# Patient Record
Sex: FEMALE | Race: WHITE | HISPANIC OR LATINO | Employment: OTHER | URBAN - METROPOLITAN AREA
[De-identification: names, ages, dates, MRNs, and addresses within clinical notes are randomized per-mention and may not be internally consistent; named-entity substitution may affect disease eponyms.]

---

## 2020-11-10 ENCOUNTER — HOSPITAL ENCOUNTER (EMERGENCY)
Facility: HOSPITAL | Age: 75
Discharge: HOME | End: 2020-11-10
Attending: EMERGENCY MEDICINE
Payer: MEDICARE

## 2020-11-10 VITALS
RESPIRATION RATE: 20 BRPM | HEART RATE: 100 BPM | WEIGHT: 204 LBS | BODY MASS INDEX: 40.05 KG/M2 | OXYGEN SATURATION: 99 % | SYSTOLIC BLOOD PRESSURE: 177 MMHG | HEIGHT: 60 IN | TEMPERATURE: 98 F | DIASTOLIC BLOOD PRESSURE: 86 MMHG

## 2020-11-10 DIAGNOSIS — S39.012A STRAIN OF LUMBAR REGION, INITIAL ENCOUNTER: ICD-10-CM

## 2020-11-10 DIAGNOSIS — G89.29 CHRONIC BILATERAL LOW BACK PAIN WITHOUT SCIATICA: Primary | ICD-10-CM

## 2020-11-10 DIAGNOSIS — M54.50 CHRONIC BILATERAL LOW BACK PAIN WITHOUT SCIATICA: Primary | ICD-10-CM

## 2020-11-10 PROCEDURE — 99284 EMERGENCY DEPT VISIT MOD MDM: CPT | Mod: 25

## 2020-11-10 PROCEDURE — 96372 THER/PROPH/DIAG INJ SC/IM: CPT

## 2020-11-10 PROCEDURE — 63600175 PHARM REV CODE 636 W HCPCS: Performed by: NURSE PRACTITIONER

## 2020-11-10 PROCEDURE — 25000003 PHARM REV CODE 250: Performed by: NURSE PRACTITIONER

## 2020-11-10 RX ORDER — MELOXICAM 15 MG/1
15 TABLET ORAL DAILY
Qty: 10 TABLET | Refills: 0 | Status: SHIPPED | OUTPATIENT
Start: 2020-11-10 | End: 2020-11-20

## 2020-11-10 RX ORDER — METHOCARBAMOL 500 MG/1
500 TABLET, FILM COATED ORAL EVERY 6 HOURS PRN
Qty: 20 TABLET | Refills: 0 | Status: SHIPPED | OUTPATIENT
Start: 2020-11-10

## 2020-11-10 RX ORDER — KETOROLAC TROMETHAMINE 30 MG/ML
30 INJECTION, SOLUTION INTRAMUSCULAR; INTRAVENOUS
Status: COMPLETED | OUTPATIENT
Start: 2020-11-10 | End: 2020-11-10

## 2020-11-10 RX ORDER — HYDROCODONE BITARTRATE AND ACETAMINOPHEN 5; 325 MG/1; MG/1
1 TABLET ORAL ONCE
Status: COMPLETED | OUTPATIENT
Start: 2020-11-10 | End: 2020-11-10

## 2020-11-10 RX ADMIN — KETOROLAC TROMETHAMINE 30 MG: 30 INJECTION, SOLUTION INTRAMUSCULAR; INTRAVENOUS at 07:11

## 2020-11-10 RX ADMIN — HYDROCODONE BITARTRATE AND ACETAMINOPHEN 1 TABLET: 5; 325 TABLET ORAL at 07:11

## 2020-11-11 NOTE — DISCHARGE INSTRUCTIONS
Take all medications as prescribed per pharmacy instructions.      Download the [email or web address] neto to access your health records & test results.  Please remember that you had a visit to the emergency room today and this does not substitute as primary care services for ongoing management because emergency services is a snap shot in time.  Should you have any worsening condition that requires emergency services do not hesitate to return to the ER.    COVID-19 TESTING  Hot Line [phone]  [address] [place] [address]  [facility]  Hours: 8am-5pm Monday - Friday   8am-noon Saturday - Sunday

## 2020-11-11 NOTE — ED PROVIDER NOTES
Encounter Date: 11/10/2020       History     Chief Complaint   Patient presents with    Back Pain     Patient complaining of lower back pain.     74-year-old female presents to ER for concerns of atraumatic low back pain; patient has chronic low back pain but the pain has been gradually worsening x1 week, pain exacerbates with attempting to stand erect, bend or twist, denies significant alleviating factor, she has prescription Ultram that has not improved the pain    Denies:  pelvic pain/numbness/tingling, loss of bowel/bladder control, previous low back injury or surgery    No previous evaluation has been performed nor has PCP Dr. Yanni Khalil been contacted for today's concerns    Past medical/surgical history, allergies & current medications reviewed with patient    Known SARS-CoV2 exposure:  No  Room:  PIT    The history is provided by the patient. No  was used.     Review of patient's allergies indicates:   Allergen Reactions    Pcn [penicillins]      Past Medical History:   Diagnosis Date    Arthritis     Chronic back pain      Past Surgical History:   Procedure Laterality Date     SECTION      HYSTERECTOMY      KNEE SURGERY       History reviewed. No pertinent family history.  Social History     Tobacco Use    Smoking status: Never Smoker   Substance Use Topics    Alcohol use: Never     Frequency: Never    Drug use: Never     Review of Systems   Constitutional: Negative for fever.   HENT: Negative for sore throat.    Respiratory: Negative for cough and shortness of breath.    Cardiovascular: Negative for chest pain.   Gastrointestinal: Negative for abdominal pain and nausea.   Genitourinary: Negative for dysuria.   Musculoskeletal: Positive for back pain, gait problem and myalgias. Negative for neck pain.   Skin: Negative for rash.   Neurological: Negative for weakness and headaches.   Hematological: Does not bruise/bleed easily.   All other systems reviewed and are  negative.      Physical Exam     Initial Vitals [11/10/20 1931]   BP Pulse Resp Temp SpO2   (!) 177/86 100 20 98.3 °F (36.8 °C) 99 %      MAP       --         Physical Exam    Nursing note and vitals reviewed.  Constitutional: She appears well-developed. She does not appear ill. No distress.   AF, VSS   HENT:   Head: Normocephalic and atraumatic.   Right Ear: External ear normal.   Left Ear: External ear normal.   Nose: Nose normal.   Eyes: Lids are normal.   Neck: Neck supple.   Cardiovascular: Normal rate.   Pulmonary/Chest: Effort normal. No respiratory distress.   Abdominal: She exhibits no distension.   Musculoskeletal:      Cervical back: Normal.      Thoracic back: Normal.      Lumbar back: She exhibits decreased range of motion (secondary to pain, unable to stand fully erect) and pain (moderate & reproducible to bilateral paraspinal muscles). She exhibits no bony tenderness (no crepitus or step-offs appreciated), no swelling, no deformity, no spasm and normal pulse.   Neurological: She is alert.   Skin: No rash noted.   Psychiatric: She has a normal mood and affect.         ED Course   Procedures  Labs Reviewed - No data to display       Imaging Results    None          Medical Decision Making:   ED Management:  Medications given:  Norco, Toradol    Findings, diagnosis & plan of care discussed with patient:  Acute on chronic low back pain/strain; in addition to Ultram that she has already taken we will prescribe Mobic and Robaxin, care instructions given -- instructed to follow-up with PCP for any further concerns    All questions answered, strict return precautions given, patient agrees with plan of care & verbalizes understanding to all instructions, pleasant visit -- vital signs are stable & patient is in no distress at discharge    Disclaimer:  This note was prepared with [employer] voice recognition transcription software. Garbled syntax, mangled pronouns, and other bizarre constructions  may be attributed to that software system.  Should there be any questions do not hesitate to contact me for clarification.                               Clinical Impression:       ICD-10-CM ICD-9-CM   1. Chronic bilateral low back pain without sciatica  M54.5 724.2    G89.29 338.29   2. Strain of lumbar region, initial encounter  S39.012A 847.2                          ED Disposition Condition    Discharge Stable        ED Prescriptions     Medication Sig Dispense Start Date End Date Auth. Provider    meloxicam (MOBIC) 15 MG tablet Take 1 tablet (15 mg total) by mouth once daily. for 10 days 10 tablet 11/10/2020 11/20/2020 Emeka Bhatia NP    methocarbamoL (ROBAXIN) 500 MG Tab Take 1 tablet (500 mg total) by mouth every 6 (six) hours as needed (pain / strain). 20 tablet 11/10/2020  Emeka Bhatia NP        Follow-up Information     Follow up With Specialties Details Why Contact Info    PCP / Dr. Yanni Khalil  Go to  For any further concerns                                        Emeka Bhatia NP  11/10/20 1943

## 2022-11-30 ENCOUNTER — OFFICE VISIT (OUTPATIENT)
Dept: URGENT CARE | Facility: CLINIC | Age: 77
End: 2022-11-30
Payer: MEDICARE

## 2022-11-30 VITALS
WEIGHT: 200 LBS | TEMPERATURE: 98 F | DIASTOLIC BLOOD PRESSURE: 64 MMHG | HEART RATE: 84 BPM | HEIGHT: 62 IN | SYSTOLIC BLOOD PRESSURE: 122 MMHG | BODY MASS INDEX: 36.8 KG/M2 | OXYGEN SATURATION: 97 %

## 2022-11-30 DIAGNOSIS — H10.9 CONJUNCTIVITIS OF BOTH EYES, UNSPECIFIED CONJUNCTIVITIS TYPE: Primary | ICD-10-CM

## 2022-11-30 PROCEDURE — 1159F PR MEDICATION LIST DOCUMENTED IN MEDICAL RECORD: ICD-10-PCS | Mod: CPTII,S$GLB,, | Performed by: NURSE PRACTITIONER

## 2022-11-30 PROCEDURE — 1126F AMNT PAIN NOTED NONE PRSNT: CPT | Mod: CPTII,S$GLB,, | Performed by: NURSE PRACTITIONER

## 2022-11-30 PROCEDURE — 3078F DIAST BP <80 MM HG: CPT | Mod: CPTII,S$GLB,, | Performed by: NURSE PRACTITIONER

## 2022-11-30 PROCEDURE — 3078F PR MOST RECENT DIASTOLIC BLOOD PRESSURE < 80 MM HG: ICD-10-PCS | Mod: CPTII,S$GLB,, | Performed by: NURSE PRACTITIONER

## 2022-11-30 PROCEDURE — 3074F PR MOST RECENT SYSTOLIC BLOOD PRESSURE < 130 MM HG: ICD-10-PCS | Mod: CPTII,S$GLB,, | Performed by: NURSE PRACTITIONER

## 2022-11-30 PROCEDURE — 1126F PR PAIN SEVERITY QUANTIFIED, NO PAIN PRESENT: ICD-10-PCS | Mod: CPTII,S$GLB,, | Performed by: NURSE PRACTITIONER

## 2022-11-30 PROCEDURE — 1159F MED LIST DOCD IN RCRD: CPT | Mod: CPTII,S$GLB,, | Performed by: NURSE PRACTITIONER

## 2022-11-30 PROCEDURE — 99203 PR OFFICE/OUTPT VISIT, NEW, LEVL III, 30-44 MIN: ICD-10-PCS | Mod: S$GLB,,, | Performed by: NURSE PRACTITIONER

## 2022-11-30 PROCEDURE — 3074F SYST BP LT 130 MM HG: CPT | Mod: CPTII,S$GLB,, | Performed by: NURSE PRACTITIONER

## 2022-11-30 PROCEDURE — 99203 OFFICE O/P NEW LOW 30 MIN: CPT | Mod: S$GLB,,, | Performed by: NURSE PRACTITIONER

## 2022-11-30 RX ORDER — KETOROLAC TROMETHAMINE 5 MG/ML
1 SOLUTION OPHTHALMIC 4 TIMES DAILY
Qty: 2.67 ML | Refills: 0 | Status: SHIPPED | OUTPATIENT
Start: 2022-11-30 | End: 2022-12-10

## 2022-11-30 RX ORDER — TRAMADOL HYDROCHLORIDE 100 MG/1
100 TABLET, EXTENDED RELEASE ORAL DAILY
COMMUNITY

## 2022-11-30 RX ORDER — RIVAROXABAN 155 MG/1
GRANULE, FOR SUSPENSION ORAL
COMMUNITY

## 2022-11-30 RX ORDER — GENTAMICIN SULFATE 3 MG/ML
2 SOLUTION/ DROPS OPHTHALMIC EVERY 4 HOURS
Qty: 10 ML | Refills: 0 | Status: SHIPPED | OUTPATIENT
Start: 2022-11-30

## 2022-11-30 NOTE — PROGRESS NOTES
Subjective:       Patient ID: Cindy Gomez is a 76 y.o. female.    Vitals:  vitals were not taken for this visit.     Chief Complaint: Eye Drainage    This is a 76 y.o. female who presents today with a chief complaint of  Patient presents with:  Eye redness and discharge. Patients states that it is burning and itching x 3 days.    Eye Problem   Both eyes are affected. This is a new problem. The current episode started in the past 7 days. The problem occurs constantly. The problem has been unchanged. There was no injury mechanism. The pain is at a severity of 0/10. The patient is experiencing no pain. Associated symptoms include blurred vision, an eye discharge and itching.     Eyes:  Positive for eye discharge, eye itching and blurred vision.     Objective:      Physical Exam      Assessment:       No diagnosis found.      Plan:         There are no diagnoses linked to this encounter.

## 2022-11-30 NOTE — PATIENT INSTRUCTIONS
You must understand that you've received an Urgent Care treatment only and that you may be released before all your medical problems are known or treated. You, the patient, will arrange for follow up care as instructed.  Follow up with your PCP or specialty clinic as directed in the next 1-2 weeks if not improved or as needed.  You can call [phone] to schedule an appointment with the appropriate provider.  If your condition worsens we recommend that you receive another evaluation at the emergency room immediately or contact your primary medical clinics after hours call service to discuss your concerns.  Please return here or go to the Emergency Department for any concerns or worsening of condition.    If you were prescribed a narcotic or controlled medication, do not drive or operate heavy equipment or machinery while taking these medications.      Take over the counter zytec, or similar allergy medication as directed for itching and irritation.